# Patient Record
(demographics unavailable — no encounter records)

---

## 2024-11-04 NOTE — DISCUSSION/SUMMARY
[de-identified] :  AUGUSTA MACIAS 60 year M was seen and evaluated in office today. Following evaluation, the natural history of the pathology was explained in full to the patient in layman's terms.   Several different treatment options were discussed and explained in full to the patient, along with specific risks and benefits of both surgical and non-surgical treatments. Nonsurgical options including but not limited to Corticosteroid Injection, Visco supplementation, Prescription anti-inflammatory medications (both steroidal and non-steroidal), activity modification, non-impact exercise, maintaining a healthy BMI, bracing, and icing were all reviewed. Surgical options including but not limited to arthroscopy, and joint replacement along with other indicated procedures were discussed.   Discussed risk of morbidity associated with proposed treatment plans. These risks include, but are not limited to, the risk associated with prescription strength medications and possible side effects of these medications which include GI hemorrhage with oral medications along with cardiac/renal issues with long term use. Risks with all pertinent surgical interventions as discussed with patient including infection, bleeding, anesthesia complications, NV injury, fracture, DVT, or heterotopic ossification.   Furthermore, discussed with AUGUSTA that they could also delay any immediate treatment options and continue to observe and self-care for the discussed problem. Discussed Home Exercise Programs as well as Rest, Ice and elevation. Patient had ample time to ask any questions about todays visit and the diagnosis, and all questions were answered. Patient understands the plan going forward.   Lastly, based on this patient's presentation at our office, which is an orthopedic specialist office, this patient inherently / intrinsically has a risk of progression of symptoms/condition, as well as development and/or exacerbation of cardiovascular disease/conditions, obesity, DVT, worsening and chronic pain, and permanent loss of function, as well as decreased abilities to complete activities of daily life.   He needs to get dental clearance.  He has not used drugs for over 30 yrs He had CSI 1 week ago Patient understands if cortisone injection is administered, total joint arthroplasty will need to be delayed 4 months.   Patient is aware he needs a right TKA. The Risks, benefits, alternatives and expectations of the proposed procedure, as well as non-operative management, were discussed at length with the patient, as well as the procedure itself and the expected recovery period. The possible need for post operative Physical Therapy was also discussed. The patient was allowed as much time as necessary to ask all appropriate questions and they were answered to the patient's satisfaction. Risks involving the TKA were discussed and include infection, bleeding, anesthesia complications, NV injury, fracture, dislocation, DVT/PE.   Patient had a CSI in his right knee about a month ago. Patient understands since cortisone injection was administered; total joint arthroplasty will need to be delayed 4 months.   Patient will follow up in 2-3 months to discuss surgical options further  Entered by Leodan Vallejo acting as scribe. Dr. Olsen Attestation The documentation recorded by the scribe, in my presence, accurately reflects the service I, Dr. Olsen, personally performed, and the decisions made by me with my edits as appropriate.

## 2024-11-04 NOTE — PHYSICAL EXAM
[Right] : right knee [de-identified] : Constitutional: The patient appears well developed, well nourished. Examination of patients ability to communicate functionally was normal.       Neurologic: Coordination is normal. Alert and oriented to time, place and person. No evidence of mood disorder, calm affect.           RIGHT  KNEE: Inspection of the knee is as follows: mild  TO MOD effusion. no ecchymosis, no streaking, no erythema, no atrophy, no deformities of the quad tendon and no deformities of patellar tendon.       Palpation of the knee is as follow LATERAL  joint line tenderness. no obvious defects, no palpable masses, no increased warmth and no crepitus.       Knee Range of Motion is as follows in degrees:       Extension: 0  WITH PAIN  Flexion: 125  WITH BUZZ N     Strength examination of the knee is as follows:       Quadriceps strength is 5/5   Hamstring strength is 5/5       Ligament Stability and Special Test:  positive McMurrays test. ligamentously stable, negative anterior draw, negative Lachman test, negative posterior draw and no varus or valgus instability. patella tracks well and able to do active straight leg raise without an extensor lag.       Neurological examination of the knee is as follows: light touch is intact throughout.       Gait and function is as follows: antalgic gait.  [FreeTextEntry9] : ap/lat/sunrise taken at [facility] show medial joint bone o n bone contact, KL grade 4 no frx noted

## 2024-11-04 NOTE — HISTORY OF PRESENT ILLNESS
[de-identified] : Patient is here for right knee pain for over a year.  he denies any injury however he notes diffuse pain lateral aspect of the knee worse with activity, WB.  he has seen pain management and had CSI, NSAIDS, steroids, Lubricant injections and nerve ablations without any significant relief.  He notes popping and clicking of the knee.  He smokes weed and he drinks beer 3-4 times a week a 6 pack each time He has hx of Hep C but he is now "cured" as well as COPD he hasnt been to the dentist in years because insurance wont cover it.   He had CSI 1 week ago without any relief.